# Patient Record
Sex: MALE | Race: WHITE | NOT HISPANIC OR LATINO | Employment: FULL TIME | URBAN - METROPOLITAN AREA
[De-identification: names, ages, dates, MRNs, and addresses within clinical notes are randomized per-mention and may not be internally consistent; named-entity substitution may affect disease eponyms.]

---

## 2019-01-12 ENCOUNTER — HOSPITAL ENCOUNTER (EMERGENCY)
Facility: HOSPITAL | Age: 64
Discharge: HOME | End: 2019-01-13
Attending: EMERGENCY MEDICINE
Payer: COMMERCIAL

## 2019-01-12 VITALS
WEIGHT: 192 LBS | RESPIRATION RATE: 21 BRPM | HEIGHT: 71 IN | SYSTOLIC BLOOD PRESSURE: 129 MMHG | OXYGEN SATURATION: 96 % | BODY MASS INDEX: 26.88 KG/M2 | DIASTOLIC BLOOD PRESSURE: 81 MMHG | HEART RATE: 77 BPM | TEMPERATURE: 99 F

## 2019-01-12 DIAGNOSIS — M62.81 GENERALIZED MUSCLE WEAKNESS: Primary | ICD-10-CM

## 2019-01-12 DIAGNOSIS — R47.81 SLURRED SPEECH: ICD-10-CM

## 2019-01-12 DIAGNOSIS — I63.9 STROKE: ICD-10-CM

## 2019-01-12 LAB
ALBUMIN SERPL BCP-MCNC: 3.9 G/DL
ALP SERPL-CCNC: 66 U/L
ALT SERPL W/O P-5'-P-CCNC: 58 U/L
AMPHET+METHAMPHET UR QL: NEGATIVE
ANION GAP SERPL CALC-SCNC: 6 MMOL/L
AST SERPL-CCNC: 37 U/L
BARBITURATES UR QL SCN>200 NG/ML: NEGATIVE
BASOPHILS # BLD AUTO: 0.04 K/UL
BASOPHILS NFR BLD: 0.5 %
BENZODIAZ UR QL SCN>200 NG/ML: NORMAL
BILIRUB SERPL-MCNC: 0.7 MG/DL
BILIRUB UR QL STRIP: NEGATIVE
BUN SERPL-MCNC: 23 MG/DL
BZE UR QL SCN: NEGATIVE
CALCIUM SERPL-MCNC: 10.4 MG/DL
CANNABINOIDS UR QL SCN: NEGATIVE
CHLORIDE SERPL-SCNC: 104 MMOL/L
CHOLEST SERPL-MCNC: 130 MG/DL
CHOLEST/HDLC SERPL: 3.7 {RATIO}
CLARITY UR REFRACT.AUTO: CLEAR
CO2 SERPL-SCNC: 23 MMOL/L
COLOR UR AUTO: NORMAL
CREAT SERPL-MCNC: 1.3 MG/DL
CREAT UR-MCNC: 28 MG/DL
DIFFERENTIAL METHOD: ABNORMAL
EOSINOPHIL # BLD AUTO: 0.3 K/UL
EOSINOPHIL NFR BLD: 4.4 %
ERYTHROCYTE [DISTWIDTH] IN BLOOD BY AUTOMATED COUNT: 12.4 %
EST. GFR  (AFRICAN AMERICAN): >60 ML/MIN/1.73 M^2
EST. GFR  (NON AFRICAN AMERICAN): 58.5 ML/MIN/1.73 M^2
ETHANOL SERPL-MCNC: <10 MG/DL
GLUCOSE SERPL-MCNC: 113 MG/DL
GLUCOSE UR QL STRIP: NEGATIVE
HCT VFR BLD AUTO: 35.5 %
HDLC SERPL-MCNC: 35 MG/DL
HDLC SERPL: 26.9 %
HGB BLD-MCNC: 12.2 G/DL
HGB UR QL STRIP: NEGATIVE
IMM GRANULOCYTES # BLD AUTO: 0.02 K/UL
IMM GRANULOCYTES NFR BLD AUTO: 0.3 %
INR PPP: 1
KETONES UR QL STRIP: NEGATIVE
LDLC SERPL CALC-MCNC: 61.4 MG/DL
LEUKOCYTE ESTERASE UR QL STRIP: NEGATIVE
LITHIUM SERPL-SCNC: 1.4 MMOL/L
LYMPHOCYTES # BLD AUTO: 1.3 K/UL
LYMPHOCYTES NFR BLD: 16.4 %
MAGNESIUM SERPL-MCNC: 2.2 MG/DL
MCH RBC QN AUTO: 32.1 PG
MCHC RBC AUTO-ENTMCNC: 34.4 G/DL
MCV RBC AUTO: 93 FL
METHADONE UR QL SCN>300 NG/ML: NEGATIVE
MONOCYTES # BLD AUTO: 0.5 K/UL
MONOCYTES NFR BLD: 6.4 %
NEUTROPHILS # BLD AUTO: 5.5 K/UL
NEUTROPHILS NFR BLD: 72 %
NITRITE UR QL STRIP: NEGATIVE
NONHDLC SERPL-MCNC: 95 MG/DL
NRBC BLD-RTO: 0 /100 WBC
OPIATES UR QL SCN: NEGATIVE
PCP UR QL SCN>25 NG/ML: NEGATIVE
PH UR STRIP: 6 [PH] (ref 5–8)
PHOSPHATE SERPL-MCNC: 2.8 MG/DL
PLATELET # BLD AUTO: 225 K/UL
PMV BLD AUTO: 9.1 FL
POTASSIUM SERPL-SCNC: 4.4 MMOL/L
PROT SERPL-MCNC: 7.2 G/DL
PROT UR QL STRIP: NEGATIVE
PROTHROMBIN TIME: 10.2 SEC
RBC # BLD AUTO: 3.8 M/UL
SODIUM SERPL-SCNC: 133 MMOL/L
SP GR UR STRIP: 1 (ref 1–1.03)
TOXICOLOGY INFORMATION: NORMAL
TRIGL SERPL-MCNC: 168 MG/DL
TSH SERPL DL<=0.005 MIU/L-ACNC: 0.47 UIU/ML
URN SPEC COLLECT METH UR: NORMAL
WBC # BLD AUTO: 7.67 K/UL

## 2019-01-12 PROCEDURE — 80061 LIPID PANEL: CPT

## 2019-01-12 PROCEDURE — 93005 ELECTROCARDIOGRAM TRACING: CPT

## 2019-01-12 PROCEDURE — 93010 EKG 12-LEAD: ICD-10-PCS | Mod: ,,, | Performed by: INTERNAL MEDICINE

## 2019-01-12 PROCEDURE — 80320 DRUG SCREEN QUANTALCOHOLS: CPT

## 2019-01-12 PROCEDURE — 80178 ASSAY OF LITHIUM: CPT

## 2019-01-12 PROCEDURE — 99285 EMERGENCY DEPT VISIT HI MDM: CPT | Mod: 25

## 2019-01-12 PROCEDURE — 99284 PR EMERGENCY DEPT VISIT,LEVEL IV: ICD-10-PCS | Mod: ,,, | Performed by: NURSE PRACTITIONER

## 2019-01-12 PROCEDURE — 80307 DRUG TEST PRSMV CHEM ANLYZR: CPT

## 2019-01-12 PROCEDURE — 93010 ELECTROCARDIOGRAM REPORT: CPT | Mod: ,,, | Performed by: INTERNAL MEDICINE

## 2019-01-12 PROCEDURE — 84443 ASSAY THYROID STIM HORMONE: CPT

## 2019-01-12 PROCEDURE — 80053 COMPREHEN METABOLIC PANEL: CPT

## 2019-01-12 PROCEDURE — 84100 ASSAY OF PHOSPHORUS: CPT

## 2019-01-12 PROCEDURE — 99284 EMERGENCY DEPT VISIT MOD MDM: CPT | Mod: ,,, | Performed by: NURSE PRACTITIONER

## 2019-01-12 PROCEDURE — 81003 URINALYSIS AUTO W/O SCOPE: CPT | Mod: 59

## 2019-01-12 PROCEDURE — 85610 PROTHROMBIN TIME: CPT

## 2019-01-12 PROCEDURE — 83735 ASSAY OF MAGNESIUM: CPT

## 2019-01-12 PROCEDURE — 85025 COMPLETE CBC W/AUTO DIFF WBC: CPT

## 2019-01-12 RX ORDER — FINASTERIDE 5 MG/1
5 TABLET, FILM COATED ORAL DAILY
COMMUNITY

## 2019-01-12 RX ORDER — AMLODIPINE BESYLATE 5 MG/1
TABLET ORAL DAILY
COMMUNITY

## 2019-01-12 RX ORDER — LITHIUM CARBONATE 300 MG/1
300 CAPSULE ORAL 2 TIMES DAILY
COMMUNITY

## 2019-01-12 RX ORDER — DIAZEPAM 5 MG/1
5 TABLET ORAL EVERY 6 HOURS PRN
COMMUNITY

## 2019-01-12 RX ORDER — RAMIPRIL 1.25 MG/1
1.25 CAPSULE ORAL DAILY
COMMUNITY

## 2019-01-12 RX ORDER — CLINDAMYCIN HYDROCHLORIDE 75 MG/1
150 CAPSULE ORAL EVERY 6 HOURS
COMMUNITY

## 2019-01-12 RX ORDER — OMEPRAZOLE 40 MG/1
40 CAPSULE, DELAYED RELEASE ORAL DAILY
COMMUNITY

## 2019-01-12 RX ORDER — HYDROCODONE BITARTRATE AND ACETAMINOPHEN 5; 325 MG/1; MG/1
1 TABLET ORAL EVERY 6 HOURS PRN
COMMUNITY

## 2019-01-12 RX ORDER — ARIPIPRAZOLE 2 MG/1
5 TABLET ORAL DAILY
COMMUNITY

## 2019-01-12 RX ORDER — GABAPENTIN 100 MG/1
CAPSULE ORAL 3 TIMES DAILY
COMMUNITY

## 2019-01-13 NOTE — ED PROVIDER NOTES
"Encounter Date: 1/12/2019       History     Chief Complaint   Patient presents with    Weakness     weakness with difficulty speaking-- family states "he's been like this since New Year's"------- seen 3 times at hospital for same complaints states family     Patient is a 63-year-old male with medical history of hypertension presenting to the ED for weakness, headache, difficulty with speech since new year's Mayra.  Patient states he has been CT [facility] twice since that time but symptoms have continued.  Patient states he has intermittent slowed and slurred speech, difficult T ambulating in a straight line and generalized weakness.  Patient states outside facility had correct his sodium and on 2nd visit he was found to be dehydrated.  Patient also endorses a mild headache that is relieved with Tylenol.  Patient denies any recent fever, chills, nausea, vomiting or diarrhea.          Review of patient's allergies indicates:  No Known Allergies  Past Medical History:   Diagnosis Date    Bipolar 1 disorder     Depression     Hypertension      Past Surgical History:   Procedure Laterality Date    CHOLECYSTECTOMY      HERNIA REPAIR      TONSILLECTOMY       History reviewed. No pertinent family history.  Social History     Tobacco Use    Smoking status: Current Every Day Smoker     Packs/day: 2.00     Types: Cigarettes    Smokeless tobacco: Never Used   Substance Use Topics    Alcohol use: Yes     Alcohol/week: 1.2 - 1.8 oz     Types: 2 - 3 Cans of beer per week    Drug use: No     Review of Systems   Constitutional: Positive for activity change and fatigue. Negative for appetite change, chills and fever.   HENT: Negative for congestion, sore throat, trouble swallowing and voice change.    Eyes: Negative for photophobia and visual disturbance.   Respiratory: Negative for cough, chest tightness, shortness of breath and wheezing.    Cardiovascular: Negative for chest pain, palpitations and leg " swelling.   Gastrointestinal: Positive for abdominal distention and diarrhea. Negative for constipation, nausea and vomiting.   Genitourinary: Negative for decreased urine volume, difficulty urinating, dysuria and urgency.   Musculoskeletal: Positive for myalgias ( generalized). Negative for arthralgias, back pain, neck pain and neck stiffness.   Skin: Negative for color change, pallor, rash and wound.   Neurological: Positive for dizziness, speech difficulty, weakness and headaches. Negative for syncope and numbness.   Hematological: Does not bruise/bleed easily.       Physical Exam     Initial Vitals [01/12/19 1837]   BP Pulse Resp Temp SpO2   (!) 142/85 88 16 98.8 °F (37.1 °C) 97 %      MAP       --         Physical Exam    Nursing note and vitals reviewed.  Constitutional: Vital signs are normal. He appears well-developed and well-nourished. He is cooperative.   HENT:   Head: Normocephalic and atraumatic. Head is without abrasion, without contusion and without laceration.   Mouth/Throat: Uvula is midline. Mucous membranes are pale and dry. Posterior oropharyngeal erythema present.   Eyes: Conjunctivae, EOM and lids are normal. Pupils are equal, round, and reactive to light.   Pupils equal round reactive 3-2 mm   Neck: Trachea normal, normal range of motion, full passive range of motion without pain and phonation normal. Neck supple. No spinous process tenderness and no muscular tenderness present.   Cardiovascular: Normal rate and regular rhythm.   Pulses:       Radial pulses are 2+ on the right side, and 2+ on the left side.        Dorsalis pedis pulses are 2+ on the right side, and 2+ on the left side.   Pulmonary/Chest: Effort normal and breath sounds normal.   Abdominal: Soft. Normal appearance and bowel sounds are normal. He exhibits distension. There is no tenderness. There is no rigidity, no rebound and no guarding.   Musculoskeletal: Normal range of motion.   Neurological: He is alert and oriented to  person, place, and time. He has normal strength. No sensory deficit. GCS eye subscore is 4. GCS verbal subscore is 5. GCS motor subscore is 6.   Skin: Skin is warm, dry and intact. Capillary refill takes less than 2 seconds. No abrasion, no laceration and no rash noted. No cyanosis. Nails show no clubbing.         ED Course   Procedures  Labs Reviewed   CBC W/ AUTO DIFFERENTIAL - Abnormal; Notable for the following components:       Result Value    RBC 3.80 (*)     Hemoglobin 12.2 (*)     Hematocrit 35.5 (*)     MCH 32.1 (*)     MPV 9.1 (*)     Lymph% 16.4 (*)     All other components within normal limits    Narrative:     ONE GREEN SHARED   COMPREHENSIVE METABOLIC PANEL - Abnormal; Notable for the following components:    Sodium 133 (*)     Glucose 113 (*)     ALT 58 (*)     Anion Gap 6 (*)     eGFR if non  58.5 (*)     All other components within normal limits    Narrative:     ONE GREEN SHARED   LIPID PANEL - Abnormal; Notable for the following components:    Triglycerides 168 (*)     HDL 35 (*)     LDL Cholesterol 61.4 (*)     All other components within normal limits    Narrative:     ONE GREEN SHARED   LITHIUM LEVEL - Abnormal; Notable for the following components:    Lithium Lvl 1.4 (*)     All other components within normal limits   PROTIME-INR    Narrative:     ONE GREEN SHARED   TSH    Narrative:     ONE GREEN SHARED   URINALYSIS, REFLEX TO URINE CULTURE    Narrative:     Preferred Collection Type->Urine, Clean Catch   DRUG SCREEN PANEL, URINE EMERGENCY    Narrative:     Preferred Collection Type->Urine, Clean Catch   ALCOHOL,MEDICAL (ETHANOL)    Narrative:     ONE GREEN SHARED   MAGNESIUM   PHOSPHORUS   MAGNESIUM    Narrative:     ONE GREEN SHARED  ADD-ON MG #[id number] PER MOLLY ESPAÑA NP 21:18  01/12/2019    PHOSPHORUS    Narrative:     ONE GREEN SHARED  ADD-ON MG #[id number] PER MOLLY ESPAÑA NP 21:18  01/12/2019           Imaging Results          MRI Brain Without Contrast (Edited  Result - FINAL)  Result time 01/13/19 00:03:31    Addendum 1 of 1 by Rachael Pal MD (01/13/19 00:03:31)      The impression should be corrected as there is a nonspecific T2/FLAIR hyperintensity in the left cerebellar hemisphere not the cerebral hemisphere.  Again, this is indeterminate and could represent an area of focal atrophy related to old infarct.  However, follow-up gadolinium MR is recommended to exclude the possibility of underlying lesion.      Electronically signed by: Rachael Pal  Date:    01/13/2019  Time:    00:03               Final result by Rachael Pal MD (01/12/19 22:55:34)                 Impression:      No acute abnormality.  Nonacute acute right cerebellar hemisphere lacunar infarct.  Nonspecific T2/FLAIR hyperintensity in the left cerebral hemisphere.  This is indeterminate and could represent area of focal atrophy related to old infarct.  Consider follow-up with gadolinium to exclude the possibility of underlying lesion.    Moderate cerebral atrophy.    This report was flagged in Epic as abnormal.      Electronically signed by: Rachael Pal  Date:    01/12/2019  Time:    22:55             Narrative:    EXAMINATION:  MRI BRAIN WITHOUT CONTRAST    CLINICAL HISTORY:  Stroke;.  Slurred speech    TECHNIQUE:  Multiplanar multisequence MR imaging of the brain was performed without contrast.    COMPARISON:  CT of the head from 01/12/2019 at 19:33    FINDINGS:  Intracranial compartment:    Ventricles are increased in size for age without evidence of hydrocephalus. No extra-axial blood or fluid collections.    There is a T2/FLAIR hyperintensity seen in the left cerebellar hemisphere.  There is tiny round T2/FLAIR hyperintensity seen in the right cerebellum.  No mass lesion, acute hemorrhage, edema or acute infarct.    Normal vascular flow voids are preserved.    Skull/extracranial contents (limited evaluation): Bone marrow signal intensity is normal.                                "X-Ray Chest AP Portable (Final result)  Result time 01/12/19 19:57:43    Final result by Juan Kuo MD (01/12/19 19:57:43)                 Impression:      Asymmetric nodular opacity over the right lung base.  Recommend further characterization with chest CT to exclude pulmonary nodule when clinically appropriate.    No acute abnormality.      Electronically signed by: Juan Kuo MD  Date:    01/12/2019  Time:    19:57             Narrative:    EXAMINATION:  XR CHEST AP PORTABLE    CLINICAL HISTORY:  Provided history is "Stroke;  ".    TECHNIQUE:  One view of the chest.    COMPARISON:  None.    FINDINGS:  Cardiac wires overlie the chest.  Cardiac silhouette is not enlarged.  No focal consolidation.  No sizable pleural effusion.  No pneumothorax.  Focal asymmetric nodule measuring roughly 1.6 cm overlies the right midlung field.  Differential includes pulmonary nodule or prominent cartilage/sclerotic rib lesion.                               CT Head Without Contrast (Final result)  Result time 01/12/19 19:49:23    Final result by Dario Arnold MD (01/12/19 19:49:23)                 Impression:      1. Vague focus of hypoattenuation within the left basal ganglia, subinsular region, age-indeterminate infarct not excluded, correlation recommended in this patient with provided history of stroke.  2. Focus of low attenuation within the left cerebellum, also may reflect age-indeterminate infarct.  Correlation, again, is needed.  3. Sequela of chronic microvascular ischemic change and senescent change.  4. Sinus disease.      Electronically signed by: Dario Arnold MD  Date:    01/12/2019  Time:    19:49             Narrative:    EXAMINATION:  CT HEAD WITHOUT CONTRAST    CLINICAL HISTORY:  Stroke;    TECHNIQUE:  Low dose axial images were obtained through the head.  Coronal and sagittal reformations were also performed. Contrast was not administered.    COMPARISON:  None.    FINDINGS:  There is " generalized cerebral volume loss.  There is hypoattenuation in a periventricular fashion, likely sequela of chronic microvascular ischemic change.There is subtle hypoattenuation in the left subinsular region, nonspecific.  There may be an additional focus of hypoattenuation versus volume averaging within the left cerebellum.  There is no evidence of acute major vascular territory infarct, hemorrhage, or mass.  There is no hydrocephalus.  There are no abnormal extra-axial fluid collections.  There is mucous membrane thickening of the right maxillary sinus, and sphenoid sinus, otherwise the visualized paranasal sinuses and mastoid air cells are clear, and there is no evidence of calvarial fracture.  The visualized soft tissues are unremarkable.                                       APC / Resident Notes:   Emergent evaluation of a 64 yo male patient presenting to the ER with chief complaint of generalized weakness, intermittent mild headache and slowed/slurred speech.  Patient's states symptoms started on new year's Mayra and have not resolved since that time.  On exam patient A&O x3. Pupils equal round reactive 3-2 mm.  Breath sounds clear bilaterally.  Abdomen soft and non tender.  Bowel sounds within normal limits. No JVD noted.  Strength 5/5 in all extremities. Patient able to ambulate while in the ED.  I will get labs, imaging and reassess.  Differential diagnoses include but are not limited to UTI, pneumonia, viral infection, electrolyte imbalance, dehydration, anemia, ICH, stroke, hepatic/drug induced encepholopathy, drug reaction, depression, psychosomatic illness, renal insufficiency/failure, hepatic failure.  I discussed the care of this patient with my Supervising Physician.      Patient's lithium level of 1.4.  Urine negative for any acute infectious process.  Urine drug screen positive for benzos.  Ethanol negative. CBC unremarkable.  H&H stable at 12.2 and 35.5.  CMP remarkable for sodium of 133.  Potassium  within normal limits of 4.4.  PT INR within normal limits. Mag and phos within normal limits.  MRI shows no acute abnormalities.  Chest x-ray negative for any acute infectious process.  CT head negative.    Patient reassessed.  Patient and family updated on lab and imaging results.  Discussed possible reasons of intermittent altered mental status and slowed speech with patient and family.  Advised family to follow up with PCP due to multiple medications and possible interactions.  Patient and family verbalized understanding of this plan of care.  All questions and concerns addressed.  Patient is hemodynamically stable, vital signs are normal. Discharge instructions given.  Referral given for neuro stroke department. Return to ED precautions discussed. Follow up as directed. Pt verbalized understanding of this plan. Pt is stable for discharge.            Attending Attestation:     Physician Attestation Statement for NP/PA:   I discussed this assessment and plan of this patient with the NP/PA, but I did not personally examine the patient. The face to face encounter was performed by the NP/PA.                     Clinical Impression:   The primary encounter diagnosis was Generalized muscle weakness. Diagnoses of Stroke and Slurred speech were also pertinent to this visit.      Disposition:   Disposition: Discharged  Condition: Stable                        Enedelia Sullivan NP  01/13/19 0012       Fito Alford MD  01/13/19 0858

## 2019-01-13 NOTE — ED NOTES
Doc Shaw, a 63 y.o. male presents to the ED via PV with CC weakness       Patient identifiers verified verbally with Doc and correct for Doc Shaw.    LOC/ APPEARANCE: The patient is AAOx4. Pt is speaking appropriately, no slurred speech. Pt changed into hospital gown. Continuous cardiac monitor, cont pulse ox, and auto BP cuff applied to patient. Pt is clean and well groomed. No JVD visible. Pt reports pain level of 0. Pt updated on POC. Bed low and locked with side rails up x2, call bell in pt reach.  SKIN: Skin is warm dry and intact, and color is consistent with ethnicity. Capillary refill <3 seconds. No breakdown or brusing visible. Mucus membranes moist, acyanotic.  RESPIRATORY: Airway is open and patent. Respirations-spontaneous, unlabored, regular rate, equal bilaterally on inspiration and expiration. No accessory muscle use noted. Lungs clear to auscultation in all fields bilaterally anterior and posterior.   CARDIAC: Patient has regular heart rate.  No peripheral edema noted, and patient has no c/o chest pain. Peripheral pulses present equal and strong throughout.  ABDOMEN: Soft and non-tender to palpation with some distention noted. Normoactive bowel sounds x4 quadrants. Pt has no complaints of abnormal bowel movements, denies blood in stool. Pt reports normal appetite.   NEUROLOGIC: Eyes open spontaneously and facial expression symmetrical. Pt behavior appropriate to situation, and pt follows commands. Pt reports sensation present in all extremities when touched with a finger, denies any numbness or tingling. PERRLA. Pt is currently c/o headache.   MUSCULOSKELETAL: Spontaneous movement noted to all extremities. Hand  equal and leg strength strong +5 bilaterally. Pt has intermittent generalized weakness.   : No complaints of frequency, burning, urgency or blood in the urine. No complaints of incontinence.

## 2019-01-13 NOTE — DISCHARGE INSTRUCTIONS
Your labs and imaging today in the ER were unremarkable. Please follow up with her PCP for further evaluation including an echocardiogram and possible change of medications.  You have been given information on follow-up with the neuro stroke center.    Our goal in the emergency department is to always give you outstanding care and exceptional service. You may receive a survey by mail or e-mail in the next week regarding your experience in our ED. We would greatly appreciate your completing and returning the survey. Your feedback provides us with a way to recognize our staff who give very good care and it helps us learn how to improve when your experience was below our aspiration of excellence.

## 2019-01-13 NOTE — ED TRIAGE NOTES
Patient states he had a tooth abcess on new years bernadette and drank too much water and was seen at [facility] for to replenish his electrolytes. Family and patient state he has had intermittent weakness and difficulty speaking since then. He was recently admitted to [facility] for dehydration.

## 2019-01-13 NOTE — ED NOTES
Pt. Dc'd home dc instructions reviewed with Pt. Verbalized understanding and will comply assisted Pt. To exit via wheelchair.

## 2019-01-16 ENCOUNTER — TELEPHONE (OUTPATIENT)
Dept: NEUROLOGY | Facility: CLINIC | Age: 64
End: 2019-01-16

## 2019-01-16 NOTE — TELEPHONE ENCOUNTER
----- Message from Kristin Tejada MA sent at 1/15/2019  3:13 PM CST -----  Contact: Marissa (wife) @ [phone]      ----- Message -----  From: Hali Nava  Sent: 1/15/2019   1:17 PM  To: Kristin Tejada MA    Calling to schedule a ED f/u appt for stroke.  Pls call with an appt.

## 2019-02-06 ENCOUNTER — TELEPHONE (OUTPATIENT)
Dept: SMOKING CESSATION | Facility: CLINIC | Age: 64
End: 2019-02-06

## 2019-02-06 NOTE — TELEPHONE ENCOUNTER
2/6/19  4:10 pm    Telephone call to patient to re-schedule missed initial intake assessment.  Left voice mail message for a return call.

## 2019-02-11 ENCOUNTER — TELEPHONE (OUTPATIENT)
Dept: SMOKING CESSATION | Facility: CLINIC | Age: 64
End: 2019-02-11

## 2019-02-12 NOTE — TELEPHONE ENCOUNTER
2/11/19   7:55 pm      Telephone call to patient to re-schedule missed initial intake assessment.  Left voice mail message for a return call.